# Patient Record
Sex: FEMALE | Race: WHITE | Employment: STUDENT | URBAN - METROPOLITAN AREA
[De-identification: names, ages, dates, MRNs, and addresses within clinical notes are randomized per-mention and may not be internally consistent; named-entity substitution may affect disease eponyms.]

---

## 2018-11-14 ENCOUNTER — ANESTHESIA EVENT (OUTPATIENT)
Dept: SURGERY | Age: 23
End: 2018-11-14
Payer: COMMERCIAL

## 2018-11-15 ENCOUNTER — ANESTHESIA (OUTPATIENT)
Dept: SURGERY | Age: 23
End: 2018-11-15
Payer: COMMERCIAL

## 2018-11-15 ENCOUNTER — HOSPITAL ENCOUNTER (OUTPATIENT)
Age: 23
Setting detail: OUTPATIENT SURGERY
Discharge: HOME | End: 2018-11-15
Attending: ORTHOPAEDIC SURGERY | Admitting: ORTHOPAEDIC SURGERY
Payer: COMMERCIAL

## 2018-11-15 VITALS
BODY MASS INDEX: 27.9 KG/M2 | SYSTOLIC BLOOD PRESSURE: 107 MMHG | OXYGEN SATURATION: 99 % | WEIGHT: 160 LBS | RESPIRATION RATE: 14 BRPM | TEMPERATURE: 98.8 F | HEART RATE: 55 BPM | DIASTOLIC BLOOD PRESSURE: 67 MMHG

## 2018-11-15 LAB — HCG UR QL: NEGATIVE

## 2018-11-15 PROCEDURE — 76210000020 HC REC RM PH II FIRST 0.5 HR: Performed by: ORTHOPAEDIC SURGERY

## 2018-11-15 PROCEDURE — 77030000032 HC CUF TRNQT ZIMM -B: Performed by: ORTHOPAEDIC SURGERY

## 2018-11-15 PROCEDURE — 74011250636 HC RX REV CODE- 250/636: Performed by: ORTHOPAEDIC SURGERY

## 2018-11-15 PROCEDURE — 74011000250 HC RX REV CODE- 250: Performed by: ORTHOPAEDIC SURGERY

## 2018-11-15 PROCEDURE — 74011250636 HC RX REV CODE- 250/636: Performed by: ANESTHESIOLOGY

## 2018-11-15 PROCEDURE — 77030010509 HC AIRWY LMA MSK TELE -A: Performed by: ANESTHESIOLOGY

## 2018-11-15 PROCEDURE — 76060000032 HC ANESTHESIA 0.5 TO 1 HR: Performed by: ORTHOPAEDIC SURGERY

## 2018-11-15 PROCEDURE — 76210000006 HC OR PH I REC 0.5 TO 1 HR: Performed by: ORTHOPAEDIC SURGERY

## 2018-11-15 PROCEDURE — 81025 URINE PREGNANCY TEST: CPT

## 2018-11-15 PROCEDURE — 74011250636 HC RX REV CODE- 250/636

## 2018-11-15 PROCEDURE — 74011250637 HC RX REV CODE- 250/637: Performed by: ANESTHESIOLOGY

## 2018-11-15 PROCEDURE — 76010000138 HC OR TIME 0.5 TO 1 HR: Performed by: ORTHOPAEDIC SURGERY

## 2018-11-15 PROCEDURE — 77030018836 HC SOL IRR NACL ICUM -A: Performed by: ORTHOPAEDIC SURGERY

## 2018-11-15 PROCEDURE — 77030019940 HC BLNKT HYPOTHRM STRY -B: Performed by: ANESTHESIOLOGY

## 2018-11-15 RX ORDER — ONDANSETRON 2 MG/ML
INJECTION INTRAMUSCULAR; INTRAVENOUS AS NEEDED
Status: DISCONTINUED | OUTPATIENT
Start: 2018-11-15 | End: 2018-11-15 | Stop reason: HOSPADM

## 2018-11-15 RX ORDER — LIDOCAINE HYDROCHLORIDE 10 MG/ML
0.1 INJECTION INFILTRATION; PERINEURAL AS NEEDED
Status: DISCONTINUED | OUTPATIENT
Start: 2018-11-15 | End: 2018-11-15 | Stop reason: HOSPADM

## 2018-11-15 RX ORDER — SODIUM CHLORIDE 0.9 % (FLUSH) 0.9 %
5-10 SYRINGE (ML) INJECTION AS NEEDED
Status: DISCONTINUED | OUTPATIENT
Start: 2018-11-15 | End: 2018-11-15 | Stop reason: HOSPADM

## 2018-11-15 RX ORDER — FENTANYL CITRATE 50 UG/ML
100 INJECTION, SOLUTION INTRAMUSCULAR; INTRAVENOUS ONCE
Status: DISCONTINUED | OUTPATIENT
Start: 2018-11-15 | End: 2018-11-15 | Stop reason: HOSPADM

## 2018-11-15 RX ORDER — DEXAMETHASONE SODIUM PHOSPHATE 4 MG/ML
INJECTION, SOLUTION INTRA-ARTICULAR; INTRALESIONAL; INTRAMUSCULAR; INTRAVENOUS; SOFT TISSUE AS NEEDED
Status: DISCONTINUED | OUTPATIENT
Start: 2018-11-15 | End: 2018-11-15 | Stop reason: HOSPADM

## 2018-11-15 RX ORDER — LIDOCAINE HYDROCHLORIDE 20 MG/ML
INJECTION, SOLUTION EPIDURAL; INFILTRATION; INTRACAUDAL; PERINEURAL AS NEEDED
Status: DISCONTINUED | OUTPATIENT
Start: 2018-11-15 | End: 2018-11-15 | Stop reason: HOSPADM

## 2018-11-15 RX ORDER — KETOROLAC TROMETHAMINE 30 MG/ML
INJECTION, SOLUTION INTRAMUSCULAR; INTRAVENOUS AS NEEDED
Status: DISCONTINUED | OUTPATIENT
Start: 2018-11-15 | End: 2018-11-15 | Stop reason: HOSPADM

## 2018-11-15 RX ORDER — FAMOTIDINE 20 MG/1
20 TABLET, FILM COATED ORAL ONCE
Status: COMPLETED | OUTPATIENT
Start: 2018-11-15 | End: 2018-11-15

## 2018-11-15 RX ORDER — MIDAZOLAM HYDROCHLORIDE 1 MG/ML
INJECTION, SOLUTION INTRAMUSCULAR; INTRAVENOUS AS NEEDED
Status: DISCONTINUED | OUTPATIENT
Start: 2018-11-15 | End: 2018-11-15 | Stop reason: HOSPADM

## 2018-11-15 RX ORDER — FENTANYL CITRATE 50 UG/ML
INJECTION, SOLUTION INTRAMUSCULAR; INTRAVENOUS AS NEEDED
Status: DISCONTINUED | OUTPATIENT
Start: 2018-11-15 | End: 2018-11-15 | Stop reason: HOSPADM

## 2018-11-15 RX ORDER — OXYCODONE HYDROCHLORIDE 5 MG/1
5 TABLET ORAL
Status: DISCONTINUED | OUTPATIENT
Start: 2018-11-15 | End: 2018-11-15 | Stop reason: HOSPADM

## 2018-11-15 RX ORDER — SODIUM CHLORIDE 0.9 % (FLUSH) 0.9 %
5-10 SYRINGE (ML) INJECTION EVERY 8 HOURS
Status: DISCONTINUED | OUTPATIENT
Start: 2018-11-15 | End: 2018-11-15 | Stop reason: HOSPADM

## 2018-11-15 RX ORDER — HYDROMORPHONE HYDROCHLORIDE 2 MG/ML
0.5 INJECTION, SOLUTION INTRAMUSCULAR; INTRAVENOUS; SUBCUTANEOUS
Status: DISCONTINUED | OUTPATIENT
Start: 2018-11-15 | End: 2018-11-15 | Stop reason: HOSPADM

## 2018-11-15 RX ORDER — CEFAZOLIN SODIUM/WATER 2 G/20 ML
2 SYRINGE (ML) INTRAVENOUS ONCE
Status: COMPLETED | OUTPATIENT
Start: 2018-11-15 | End: 2018-11-15

## 2018-11-15 RX ORDER — PROPOFOL 10 MG/ML
INJECTION, EMULSION INTRAVENOUS AS NEEDED
Status: DISCONTINUED | OUTPATIENT
Start: 2018-11-15 | End: 2018-11-15 | Stop reason: HOSPADM

## 2018-11-15 RX ORDER — BUPIVACAINE HYDROCHLORIDE 5 MG/ML
INJECTION, SOLUTION EPIDURAL; INTRACAUDAL AS NEEDED
Status: DISCONTINUED | OUTPATIENT
Start: 2018-11-15 | End: 2018-11-15 | Stop reason: HOSPADM

## 2018-11-15 RX ORDER — SODIUM CHLORIDE, SODIUM LACTATE, POTASSIUM CHLORIDE, CALCIUM CHLORIDE 600; 310; 30; 20 MG/100ML; MG/100ML; MG/100ML; MG/100ML
75 INJECTION, SOLUTION INTRAVENOUS CONTINUOUS
Status: DISCONTINUED | OUTPATIENT
Start: 2018-11-15 | End: 2018-11-15 | Stop reason: HOSPADM

## 2018-11-15 RX ORDER — OXYCODONE HYDROCHLORIDE 5 MG/1
10 TABLET ORAL
Status: COMPLETED | OUTPATIENT
Start: 2018-11-15 | End: 2018-11-15

## 2018-11-15 RX ORDER — ACETAMINOPHEN 10 MG/ML
INJECTION, SOLUTION INTRAVENOUS AS NEEDED
Status: DISCONTINUED | OUTPATIENT
Start: 2018-11-15 | End: 2018-11-15 | Stop reason: HOSPADM

## 2018-11-15 RX ORDER — MIDAZOLAM HYDROCHLORIDE 1 MG/ML
2 INJECTION, SOLUTION INTRAMUSCULAR; INTRAVENOUS ONCE
Status: DISCONTINUED | OUTPATIENT
Start: 2018-11-15 | End: 2018-11-15 | Stop reason: HOSPADM

## 2018-11-15 RX ADMIN — ONDANSETRON 4 MG: 2 INJECTION INTRAMUSCULAR; INTRAVENOUS at 13:37

## 2018-11-15 RX ADMIN — SODIUM CHLORIDE, SODIUM LACTATE, POTASSIUM CHLORIDE, AND CALCIUM CHLORIDE 75 ML/HR: 600; 310; 30; 20 INJECTION, SOLUTION INTRAVENOUS at 12:09

## 2018-11-15 RX ADMIN — LIDOCAINE HYDROCHLORIDE 100 MG: 20 INJECTION, SOLUTION EPIDURAL; INFILTRATION; INTRACAUDAL; PERINEURAL at 13:28

## 2018-11-15 RX ADMIN — Medication 2 G: at 13:32

## 2018-11-15 RX ADMIN — DEXAMETHASONE SODIUM PHOSPHATE 8 MG: 4 INJECTION, SOLUTION INTRA-ARTICULAR; INTRALESIONAL; INTRAMUSCULAR; INTRAVENOUS; SOFT TISSUE at 13:37

## 2018-11-15 RX ADMIN — OXYCODONE HYDROCHLORIDE 10 MG: 5 TABLET ORAL at 14:26

## 2018-11-15 RX ADMIN — FENTANYL CITRATE 50 MCG: 50 INJECTION, SOLUTION INTRAMUSCULAR; INTRAVENOUS at 13:28

## 2018-11-15 RX ADMIN — MIDAZOLAM HYDROCHLORIDE 2 MG: 1 INJECTION, SOLUTION INTRAMUSCULAR; INTRAVENOUS at 13:22

## 2018-11-15 RX ADMIN — FENTANYL CITRATE 50 MCG: 50 INJECTION, SOLUTION INTRAMUSCULAR; INTRAVENOUS at 13:37

## 2018-11-15 RX ADMIN — ACETAMINOPHEN 1000 MG: 10 INJECTION, SOLUTION INTRAVENOUS at 13:37

## 2018-11-15 RX ADMIN — FAMOTIDINE 20 MG: 20 TABLET ORAL at 12:09

## 2018-11-15 RX ADMIN — KETOROLAC TROMETHAMINE 30 MG: 30 INJECTION, SOLUTION INTRAMUSCULAR; INTRAVENOUS at 13:48

## 2018-11-15 RX ADMIN — PROPOFOL 200 MG: 10 INJECTION, EMULSION INTRAVENOUS at 13:28

## 2018-11-15 NOTE — DISCHARGE INSTRUCTIONS
INSTRUCTIONS FOLLOWING FOOT SURGERY    ACTIVITY  Elevate foot. No Ice  Protected partial weight bearing on the heel only as tolerated in post op shoe/boot after full sensation returns. Let the office know if dressing gets saturated with water . DIET  Day of Surgery: Clear liquids until no nausea or vomiting; then light, bland diet (Baked chicken, plain rice, grits, scrambled egg, toast). Nothing Greasy, fried or spicy today  Advance to regular diet on second day, unless your doctor orders otherwise. PAIN  Take pain medications as directed by your doctor. Call your doctor if pain is NOT relieved by medication. PAIN MED SIDE EFFECTS  Constipation: Lots of fluids, try prune juice, then OTC stool softeners if necessary  Nausea: Take medication with food. DRESSING CARE Keep dry and in place until follow up appointment    CALL YOUR DOCTOR IF YOU HAVE  Excessive bleeding that does not stop after holding mild pressure over the area. Temperature of 101 degrees or above. Redness, excessive swelling or bruising, and/or green or yellow, smelly discharge from incision. Loss of sensation - cold, white or blue toes. AFTER ANESTHESIA  For the first 24 hours and while taking narcotics for pain: DO NOT Drive, Drink Alcoholic beverages, or make important Decisions. Be aware of dizziness following anesthesia and while taking pain medication. Preventing Infection at Home  We care about preventing infection and avoiding the spread of germs - not only when you are in the hospital but also when you return home. When you return home from the hospital, its important to take the following steps to help prevent infection and avoid spreading germs that could infect you and others. Ask everyone in your home to follow these guidelines, too. Clean Your Hands  · Clean your hands whenever your hands are visibly dirty, before you eat, before or after touching your mouth, nose or eyes, and before preparing food.  Clean them after contact with body fluids, using the restroom, touching animals or changing diapers. · When washing hands, wet them with warm water and work up a lather. Rub hands for at least 15 seconds, then rinse them and pat them dry with a clean towel or paper towel. · When using hand sanitizers, it should take about 15 seconds to rub your hands dry. If not, you probably didnt apply enough . Cover Your Sneeze or Cough  Germs are released into the air whenever you sneeze or cough. To prevent the spread of infection:  · Turn away from other people before coughing or sneezing. · Cover your mouth or nose with a tissue when you cough or sneeze. Put the tissue in the trash. · If you dont have a tissue, cough or sneeze into your upper sleeve, not your hands. · Always clean your hands after coughing or sneezing. Care for Wounds  Your skin is your bodys first line of defense against germs, but an open wound leaves an easy way for germs to enter your body. To prevent infection:  · Clean your hands before and after changing wound dressings, and wear gloves to change dressings if recommended by your doctor. · Take special care with IV lines or other devices inserted into the body. If you must touch them, clean your hands first.  · Follow any specific instructions from your doctor to care for your wounds. Contact your doctor if you experience any signs of infection, such as fever or increased redness at the surgical or wound site. Keep a Clean Home  · Clean or wipe commonly touched hard surfaces like door handles, sinks, tabletops, phones and TV remotes. · Use products labeled disinfectant to kill harmful bacteria and viruses. · Use a clean cloth or paper towel to clean and dry surfaces. Wiping surfaces with a dirty dishcloth, sponge or towel will only spread germs.   · Never share toothbrushes, galeana, drinking glasses, utensils, razor blades, face cloths or bath towels to avoid spreading germs.  · Be sure that the linens that you sleep on are clean. · Keep pets away from wounds and wash your hands after touching pets, their toys or bedding. We care about you and your health. Remember, preventing infections is a team effort between you, your family, friends and health care providers. DISCHARGE SUMMARY from Nurse    PATIENT INSTRUCTIONS:    After general anesthesia or intravenous sedation, for 24 hours or while taking prescription Narcotics:  · Limit your activities  · Do not drive and operate hazardous machinery  · Do not make important personal or business decisions  · Do  not drink alcoholic beverages  · If you have not urinated within 8 hours after discharge, please contact your surgeon on call. *  Please give a list of your current medications to your Primary Care Provider. *  Please update this list whenever your medications are discontinued, doses are      changed, or new medications (including over-the-counter products) are added. *  Please carry medication information at all times in case of emergency situations. These are general instructions for a healthy lifestyle:    No smoking/ No tobacco products/ Avoid exposure to second hand smoke    Surgeon General's Warning:  Quitting smoking now greatly reduces serious risk to your health. Obesity, smoking, and sedentary lifestyle greatly increases your risk for illness    A healthy diet, regular physical exercise & weight monitoring are important for maintaining a healthy lifestyle    You may be retaining fluid if you have a history of heart failure or if you experience any of the following symptoms:  Weight gain of 3 pounds or more overnight or 5 pounds in a week, increased swelling in our hands or feet or shortness of breath while lying flat in bed. Please call your doctor as soon as you notice any of these symptoms; do not wait until your next office visit.     Recognize signs and symptoms of STROKE:    F-face looks uneven    A-arms unable to move or move unevenly    S-speech slurred or non-existent    T-time-call 911 as soon as signs and symptoms begin-DO NOT go       Back to bed or wait to see if you get better-TIME IS BRAIN.

## 2018-11-15 NOTE — ANESTHESIA PREPROCEDURE EVALUATION
Anesthetic History Review of Systems / Medical History Patient summary reviewed and pertinent labs reviewed Pulmonary Neuro/Psych Cardiovascular Exercise tolerance: >4 METS 
  
GI/Hepatic/Renal 
  
 
 
 
 
 
 Endo/Other Other Findings Physical Exam 
 
Airway Mallampati: I 
TM Distance: > 6 cm Neck ROM: normal range of motion Mouth opening: Normal 
 
 Cardiovascular Regular rate and rhythm,  S1 and S2 normal,  no murmur, click, rub, or gallop Rhythm: regular Rate: normal 
 
 
 
 Dental 
No notable dental hx Pulmonary Breath sounds clear to auscultation Abdominal 
 
 
 
 Other Findings Anesthetic Plan ASA: 1 Anesthesia type: general 
 
 
 
 
 
Anesthetic plan and risks discussed with: Patient

## 2018-11-15 NOTE — BRIEF OP NOTE
BRIEF OPERATIVE NOTE Date of Procedure: 11/15/2018 Preoperative Diagnosis: Ganglion cyst of left foot [M67.472] Postoperative Diagnosis: Ganglion cyst of left foot [M67.472] Procedure(s): LEFT MIDFOOT EXOSTECTOMY 
LEFT DPN NEUROLYSIS ANES-PT CHOICE Surgeon(s) and Role: * Jake Del Valle MD - Primary Surgical Assistant: no 
 
Surgical Staff: 
Circ-1: Suzy Pérez RN Scrub Tech-1: Carlos Ramirez Event Time In Time Out Incision Start [phone] Incision Close 7716 Anesthesia: General  
Estimated Blood Loss: min Specimens: * No specimens in log * Findings: no 
Complications: no 
Implants: * No implants in log *

## 2018-11-16 NOTE — OP NOTES
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 PROCEDURES PERFORMED:    1. Left midfoot exostectomy. 2.  Left deep peroneal tendon neurolysis. SURGEON:  Becca Harkins MD        ANESTHESIA:  Local anesthesia with general.    ESTIMATED BLOOD LOSS:  Minimal.        COMPLICATIONS:  None. TOURNIQUET TIME:  15 minutes at 250 mmHg. ANTIBIOTIC PROPHYLAXIS:  Ancef was given prior to procedure. INDICATION FOR PROCEDURE:  This patient is a 59-year-old white female with symptomatic left mid foot exostosis and deep peroneal nerve neuritis. She has failed conservative therapy and desires surgical treatment. Risks and benefits of procedure including but not limited to risk of anesthetic complication including myocardial infarction, stroke, and death, as well as surgical complications including damage to nerves and blood vessels, risk for infection, incomplete pain relief, and risk and need for additional surgery were discussed with the patient. She understands the risks and wishes to proceed with surgery at this time. DESCRIPTION OF PROCEDURE:  The patient's operative site was marked with indelible ink in the preoperative holding area. She was brought to operating room and placed supine. After preoperative surgical timeout, the left lower extremity was identified as the surgical site, prepped and draped in standard sterile fashion with ChloraPrep solution. A dorsal approach to the mid foot was then performed at that time. The deep peroneal nerve and dorsalis pedis artery were identified and carefully protected.   The deep peroneal nerve neurolysis was performed using a dissecting scissors that time as well. Capsulotomy of the underlying joint was then performed, and a mid foot exostectomy of the cuneiforms was performed using a rongeur. Wounds were irrigated and closed using Monocryl and nylon sutures throughout. A sterile dressing was then applied. Anesthesia was discontinued. The patient was subsequently transferred to a recovery bed and taken to the recovery room in satisfactory condition. She appeared to tolerate the procedure well. There were no apparent surgical or anesthetic complications. All needle and sponge counts correct.       MD ERNESTINE Mccallum / MN  D: 11/15/2018 15:57     T: 11/15/2018 20:15  JOB #: [id number]

## 2018-11-30 ENCOUNTER — HOSPITAL ENCOUNTER (OUTPATIENT)
Dept: PHYSICAL THERAPY | Age: 23
Discharge: HOME | End: 2018-11-30
Payer: COMMERCIAL

## 2018-11-30 PROCEDURE — 97110 THERAPEUTIC EXERCISES: CPT

## 2018-11-30 PROCEDURE — 97140 MANUAL THERAPY 1/> REGIONS: CPT

## 2018-11-30 PROCEDURE — 97016 VASOPNEUMATIC DEVICE THERAPY: CPT

## 2018-11-30 PROCEDURE — 97161 PT EVAL LOW COMPLEX 20 MIN: CPT

## 2018-11-30 NOTE — THERAPY EVALUATION
Dominique Grayson  : 1995 [id number] [facility]  [address] [place] [address].  [phone]   [phone]        OUTPATIENT PHYSICAL THERAPY:Initial Assessment 2018         ICD-10: Treatment Diagnosis: Pain in left foot (S85.917); Difficulty in walking, not elsewhere classified (R26.2)  Precautions/Allergies:   Patient has no known allergies. Fall Risk Score:    Ambulatory/Rehab Services H2 Model Falls Risk Assessment    Risk Factors:       No Risk Factors Identified Ability to Rise from Chair:       (0)  Ability to rise in a single movement    Falls Prevention Plan:       No modifications necessary   Total: (5 or greater = High Risk): 0     [facility] [employer]. All Rights Reserved. [facility] Patent #[id number]. Federal Law prohibits the replication, distribution or use without written permission from [employer]      (? 5 = High Risk)  MD Orders: evaluate and treat MEDICAL/REFERRING DIAGNOSIS:  Foot pain [M79.673]   DATE OF ONSET: date of surgery: 11/15/18; please refer to medical chart for specifics  REFERRING PHYSICIAN: Segundo De La Fuente MD  RETURN PHYSICIAN APPOINTMENT: 2019     INITIAL ASSESSMENT:  Ms. Jann Elmore presents to therapy with left foot pain following above surgical procedure with removal of accessory cuneiform and release of deep peroneal nerve. She has impaired mobility through the foot and ankle with rigid, pes cavus foot posture, increased tightness of the talocrural joint and calf musculature, and increased swelling of the midfoot. She has weakness throughout the lower extremity. Weakness is present in the calf and plantar flexor musculature secondary to atrophy from immobilization. Weakness is present through the intrinsic foot extensor musculature secondary to residual motor dysfunction. This causes difficulty with all walking and mobility tasks.  Skilled therapy is required to return to prior level of function. PROBLEM LIST (Impacting functional limitations):  1. Decreased Strength  2. Decreased ADL/Functional Activities  3. Decreased Transfer Abilities  4. Decreased Ambulation Ability/Technique  5. Decreased Balance  6. Increased Pain  7. Decreased Activity Tolerance  8. Decreased Flexibility/Joint Mobility INTERVENTIONS PLANNED:  1. Balance Exercise  2. Family Education  3. Gait Training  4. Home Exercise Program (HEP)  5. Manual Therapy  6. Neuromuscular Re-education/Strengthening  7. Range of Motion (ROM)  8. Therapeutic Activites  9. Therapeutic Exercise/Strengthening  10. modalities    TREATMENT PLAN:  Effective Dates: 11/30/2018 TO 3/3/2019 (90 days). Frequency/Duration: 3 times a week for 90 Days  GOALS: (Goals have been discussed and agreed upon with patient.)  Short-Term Functional Goals: Time Frame: 2 weeks  1. Patient will be independent with HEP. 2. Patient will demonstrate sufficient healing to discontinue use of CAM boot for all mobility. 3. Patient will improve ankle DF to 10 ° to restore joint mobility for symmetric stance phase of gait during walking speeds. Discharge Goals: Time Frame: 6 weeks  1. Patient will report no pain with daily activity. 2. Patient will be able to perform 25 single limb heel raises to normalize calf strength for return to sport demands. 3. Patient will improve ankle DF to 15 ° to restore mobility required for symmetric gait at all speeds. Rehabilitation Potential For Stated Goals: Excellent  Regarding Ervin Beard's therapy, I certify that the treatment plan above will be carried out by a therapist or under their direction. Thank you for this referral,  Zahida Meza DPT       Referring Physician Signature: Paras Sorenson MD              Date                      HISTORY:   History of Present Injury/Illness (Reason for Referral):  Patient presents to therapy with primary complaint of left foot pain following above surgical repair.  Initial symptoms began over the summer when she began to notice a lump at the dorsum of the midfoot. She tried various modifications to shoe lacing to improve her comfort but she began to have numbness into the toes and difficulty with running and walking activities. She was also having pain with pushing off during the running stride or with plantar flexion movements. She notes no complications with surgery and reports the foot has been feeling good. She continues to have some numbness into the toes and reports that the ankle feels weak. She is no longer having to take medication to manage her symptoms. She notes she is cleared to wean from the boot as her symptoms allow but she has had continued soreness and swelling with increased walking around campus. 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 Past Medical History/Comorbidities:   Ms. Macie Hernandez  has a past medical history of Intestinal malrotation. Ms. Macie Hernandez  has a past surgical history that includes pr abdomen surgery proc unlisted (2011); hx appendectomy (2011); hx wisdom teeth extraction; LEFT MIDFOOT EXOSTECTOMY (Left, 11/15/2018); and LEFT DPN NEUROLYSIS ANES-PT CHOICE (Left, 11/15/2018). Social History/Living Environment:     Patient is a college student and lives on campus. 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 , Disp: , Rfl:     BLISOVI FE 1.5/30, 28, 1.5 mg-30 mcg (21)/75 mg (7) tab, , Disp: , Rfl:    Date Last Reviewed:  11/30/2018   # of Personal Factors/Comorbidities that affect the Plan of Care: 0: LOW COMPLEXITY EXAMINATION:   Observation/Orthostatic Postural Assessment:          Girth: Figure 8: 49cm B; Mid foot: 23cm L, 22cm R; Calf: 38cm L, 40 cm R. Ambulates with CAM boot. Incision is closed, dry, and covered with steri strips. Palpation:          Mild tenderness with palpation of the midfoot. (+) Tinels at the deep peroneal nerve at the anterior talocrural joint. ROM:     Ankle DF: 0 ° L; 10 ° R  Ankle PF: WNL  Ankle Inversion: 30 ° L; 40 ° R  Ankle eversion: 10 ° L; 22 ° R  Hip and knee mobility are foot      Strength:     EHL: 3/5 L; 5/5 R  Eversion: 4/5 L; 5/5 R  Inversion: 4+/5 L; 5/5 R  DF: 4-/5 L; 5/5 R  PF: 4-/5 L; 5/5 R      Special Tests:          Not applicable  Neurological Screen:        Light touch is diminished along lateral lower leg and into the dorsum of the foot. Functional Mobility:         Ambulates with walking boot  Balance:          Not currently indicated   Body Structures Involved:  1. Nerves  2. Bones  3. Joints  4. Muscles  5. Ligaments Body Functions Affected:  1. Sensory/Pain  2. Neuromusculoskeletal  3. Movement Related Activities and Participation Affected:  1. Learning and Applying Knowledge  2. General Tasks and Demands  3. Mobility  4. Self Care  5. Domestic Life  6. Interpersonal Interactions and Relationships  7. Community, Social and [place]   # of elements that affect the Plan of Care: 1-2: LOW COMPLEXITY   CLINICAL PRESENTATION:   Presentation: Stable and uncomplicated: LOW COMPLEXITY   CLINICAL DECISION MAKING:   Outcome Measure: Tool Used: Lower Extremity Functional Scale (LEFS)  Score:  Initial: 58/80 Most Recent: X/80 (Date: -- )   Interpretation of Score: 20 questions each scored on a 5 point scale with 0 representing \"extreme difficulty or unable to perform\" and 4 representing \"no difficulty\". The lower the score, the greater the functional disability. 80/80 represents no disability. Minimal detectable change is 9 points.   Score 80 79-63 62-48 47-32 31-16 15-1 0 Modifier CH CI CJ CK CL CM CN         Medical Necessity:   · Patient is expected to demonstrate progress in strength, range of motion, balance and coordination to increase independence with community mobility and return to prior level of function. Reason for Services/Other Comments:  · Patient has demonstrated an improvement in functional level by independent performance of HEP. Use of outcome tool(s) and clinical judgement create a POC that gives a: Clear prediction of patient's progress: LOW COMPLEXITY   TREATMENT:   (In addition to Assessment/Re-Assessment sessions the following treatments were rendered)  THERAPEUTIC EXERCISE: (see below for minutes):  Exercises per grid below to improve mobility, strength, balance and coordination. Required minimal verbal and manual cues to promote proper body alignment, promote proper body posture and promote proper body mechanics. Progressed resistance, range, repetitions and complexity of movement as indicated. MANUAL THERAPY: (see below for minutes): Joint mobilization and Soft tissue mobilization was utilized and necessary because of the patient's restricted joint motion, painful spasm, loss of articular motion and restricted motion of soft tissue. MODALITIES: (see below for minutes): To decrease pain     Date: 11/3018        Modalities: 15 min       Gameready vasopneumatic with premod stim to peroneal nerve to decrease pain and swelling                       Therapeutic Exercise: 15  min       Lift-spread-place 5 min       Towel curls 3 min       Ankle circles 30x CW and CCW       Supine peroneal nerve glides With hip biased into adduction/IR [id number]       Hip abduction 3x15               Proprioceptive Activities:                                Manual Therapy:                        Therapeutic Activities:                                        HEP: see 11/30/18 flow sheet for specifics.   [employer] Portal  Treatment/Session Assessment:  Patient is independent with performance of above described home program.    · Pain/ Symptoms: Initial:   8/10 Post Session:  No increase following today's treatment session. ·   Compliance with Program/Exercises: Will assess as treatment progresses. · Recommendations/Intent for next treatment session: \"Next visit will focus on advancements to more challenging activities\".   Total Treatment Duration:  PT Patient Time In/Time Out  Time In: 1445  Time Out: [address], DPT

## 2018-12-04 ENCOUNTER — HOSPITAL ENCOUNTER (OUTPATIENT)
Dept: PHYSICAL THERAPY | Age: 23
Discharge: HOME | End: 2018-12-04
Payer: COMMERCIAL

## 2018-12-04 PROCEDURE — 97110 THERAPEUTIC EXERCISES: CPT

## 2018-12-04 PROCEDURE — 97022 WHIRLPOOL THERAPY: CPT

## 2018-12-04 NOTE — PROGRESS NOTES
Dominique Grayson  : 1995 [address] [facility] [address]  [address] [place] [address]  [phone]   [phone]        OUTPATIENT PHYSICAL THERAPY:Daily Note 2018         ICD-10: Treatment Diagnosis: Pain in left foot (L84.516); Difficulty in walking, not elsewhere classified (R26.2)  Precautions/Allergies:   Patient has no known allergies. Fall Risk Score:    Ambulatory/Rehab Services H2 Model Falls Risk Assessment    Risk Factors:       No Risk Factors Identified Ability to Rise from Chair:       (0)  Ability to rise in a single movement    Falls Prevention Plan:       No modifications necessary   Total: (5 or greater = High Risk): 0     [facility] [employer]. All Rights Reserved. [facility] Patent #[id number]. Federal Law prohibits the replication, distribution or use without written permission from [employer]      (? 5 = High Risk)  MD Orders: evaluate and treat MEDICAL/REFERRING DIAGNOSIS:  Pain in unspecified foot [M79.673]   DATE OF ONSET: date of surgery: 11/15/18; please refer to medical chart for specifics  REFERRING PHYSICIAN: Segundo De La Fuente MD  RETURN PHYSICIAN APPOINTMENT: 2019     INITIAL ASSESSMENT:  Ms. Jann Elmore presents to therapy with left foot pain following above surgical procedure with removal of accessory cuneiform and release of deep peroneal nerve. She has impaired mobility through the foot and ankle with rigid, pes cavus foot posture, increased tightness of the talocrural joint and calf musculature, and increased swelling of the midfoot. She has weakness throughout the lower extremity. Weakness is present in the calf and plantar flexor musculature secondary to atrophy from immobilization. Weakness is present through the intrinsic foot extensor musculature secondary to residual motor dysfunction. This causes difficulty with all walking and mobility tasks.  Skilled therapy is required to return to prior level of function. PROBLEM LIST (Impacting functional limitations):  1. Decreased Strength  2. Decreased ADL/Functional Activities  3. Decreased Transfer Abilities  4. Decreased Ambulation Ability/Technique  5. Decreased Balance  6. Increased Pain  7. Decreased Activity Tolerance  8. Decreased Flexibility/Joint Mobility INTERVENTIONS PLANNED:  1. Balance Exercise  2. Family Education  3. Gait Training  4. Home Exercise Program (HEP)  5. Manual Therapy  6. Neuromuscular Re-education/Strengthening  7. Range of Motion (ROM)  8. Therapeutic Activites  9. Therapeutic Exercise/Strengthening  10. modalities    TREATMENT PLAN:  Effective Dates: 11/30/2018 TO 3/3/2019 (90 days). Frequency/Duration: 3 times a week for 90 Days  GOALS: (Goals have been discussed and agreed upon with patient.)  Short-Term Functional Goals: Time Frame: 2 weeks  1. Patient will be independent with HEP. 2. Patient will demonstrate sufficient healing to discontinue use of CAM boot for all mobility. 3. Patient will improve ankle DF to 10 ° to restore joint mobility for symmetric stance phase of gait during walking speeds. Discharge Goals: Time Frame: 6 weeks  1. Patient will report no pain with daily activity. 2. Patient will be able to perform 25 single limb heel raises to normalize calf strength for return to sport demands. 3. Patient will improve ankle DF to 15 ° to restore mobility required for symmetric gait at all speeds. Rehabilitation Potential For Stated Goals: Excellent                HISTORY:   History of Present Injury/Illness (Reason for Referral):  Patient presents to therapy with primary complaint of left foot pain following above surgical repair. Initial symptoms began over the summer when she began to notice a lump at the dorsum of the midfoot. She tried various modifications to shoe lacing to improve her comfort but she began to have numbness into the toes and difficulty with running and walking activities.  She was also having pain with pushing off during the running stride or with plantar flexion movements. She notes no complications with surgery and reports the foot has been feeling good. She continues to have some numbness into the toes and reports that the ankle feels weak. She is no longer having to take medication to manage her symptoms. She notes she is cleared to wean from the boot as her symptoms allow but she has had continued soreness and swelling with increased walking around campus. 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 Past Medical History/Comorbidities:   Ms. Aleta Mukherjee  has a past medical history of Intestinal malrotation. Ms. Aleta Mukherjee  has a past surgical history that includes pr abdomen surgery proc unlisted (2011); hx appendectomy (2011); hx wisdom teeth extraction; LEFT MIDFOOT EXOSTECTOMY (Left, 11/15/2018); and LEFT DPN NEUROLYSIS ANES-PT CHOICE (Left, 11/15/2018). Social History/Living Environment:     Patient is a college student and lives on campus. 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 , Disp: , Rfl:     BLISOVI FE 1.5/30, 28, 1.5 mg-30 mcg (21)/75 mg (7) tab, , Disp: , Rfl:    Date Last Reviewed:  12/4/2018   EXAMINATION:   Observation/Orthostatic Postural Assessment:          Girth: Figure 8: 49cm B; Mid foot: 23cm L, 22cm R; Calf: 38cm L, 40 cm R. Ambulates with CAM boot. Incision is closed, dry, and covered with steri strips. Palpation:          Mild tenderness with palpation of the midfoot. (+) Tinels at the deep peroneal nerve at the anterior talocrural joint.   ROM: Ankle DF: 0 ° L; 10 ° R  Ankle PF: WNL  Ankle Inversion: 30 ° L; 40 ° R  Ankle eversion: 10 ° L; 22 ° R  Hip and knee mobility are foot      Strength:     EHL: 3/5 L; 5/5 R  Eversion: 4/5 L; 5/5 R  Inversion: 4+/5 L; 5/5 R  DF: 4-/5 L; 5/5 R  PF: 4-/5 L; 5/5 R      Special Tests:          Not applicable  Neurological Screen:        Light touch is diminished along lateral lower leg and into the dorsum of the foot. Functional Mobility:         Ambulates with walking boot  Balance:          Not currently indicated   Body Structures Involved:  1. Nerves  2. Bones  3. Joints  4. Muscles  5. Ligaments Body Functions Affected:  1. Sensory/Pain  2. Neuromusculoskeletal  3. Movement Related Activities and Participation Affected:  1. Learning and Applying Knowledge  2. General Tasks and Demands  3. Mobility  4. Self Care  5. Domestic Life  6. Interpersonal Interactions and Relationships  7. Community, Social and Civic Life   CLINICAL PRESENTATION:   CLINICAL DECISION MAKING:   Outcome Measure: Tool Used: Lower Extremity Functional Scale (LEFS)  Score:  Initial: 58/80 Most Recent: X/80 (Date: -- )   Interpretation of Score: 20 questions each scored on a 5 point scale with 0 representing \"extreme difficulty or unable to perform\" and 4 representing \"no difficulty\". The lower the score, the greater the functional disability. 80/80 represents no disability. Minimal detectable change is 9 points. Score 80 79-63 62-48 47-32 31-16 15-1 0   Modifier CH CI CJ CK CL CM CN         Medical Necessity:   · Patient is expected to demonstrate progress in strength, range of motion, balance and coordination to increase independence with community mobility and return to prior level of function. Reason for Services/Other Comments:  · Patient has demonstrated an improvement in functional level by independent performance of HEP.    TREATMENT:   (In addition to Assessment/Re-Assessment sessions the following treatments were rendered)  THERAPEUTIC EXERCISE: (see below for minutes):  Exercises per grid below to improve mobility, strength, balance and coordination. Required minimal verbal and manual cues to promote proper body alignment, promote proper body posture and promote proper body mechanics. Progressed resistance, range, repetitions and complexity of movement as indicated. MANUAL THERAPY: (see below for minutes): Joint mobilization and Soft tissue mobilization was utilized and necessary because of the patient's restricted joint motion, painful spasm, loss of articular motion and restricted motion of soft tissue. MODALITIES: (see below for minutes): To decrease pain     Date: 11/3018 12/04/18 (visit 2)      Modalities: 15 min 10 min      Gameready vasopneumatic with premod stim to peroneal nerve to decrease pain and swelling Cold whirlpool x 10 min                       Therapeutic Exercise: 15  min 45 min      Lift-spread-place 5 min 5 min      Towel curls 3 min Inversion and eversion 10# with towel 3 min each      Ankle circles 30x CW and CCW       Supine peroneal nerve glides With hip biased into adduction/IR [id number]       Hip abduction 3x15       Bike  5 min      Ankle MWM   20x with LLE forward      Knee hugs  x1 lap      Shuffle walk  x1 lap      Ankle PF  Seated black tband 2x20      Toe raises  Leaning on wall 2x20              Proprioceptive Activities:                                Manual Therapy:                        Therapeutic Activities:                                        HEP: see 11/30/18 flow sheet for specifics. [facility] Portal  Treatment/Session Assessment:  Demonstrates sufficient healing to discontinue use of walking boot around apartment but will continue to use boot around campus to allow for further resolution of swelling and soreness. · Pain/ Symptoms: Initial:   6/10 \"It's been less sore. It doesn't seem to be as swollen either. \" Post Session:  No increase following today's treatment session. ·   Compliance with Program/Exercises: Will assess as treatment progresses. · Recommendations/Intent for next treatment session: \"Next visit will focus on advancements to more challenging activities\".   Total Treatment Duration:  PT Patient Time In/Time Out  Time In: 0845  Time Out: Guerita Reynoso [address], DPT

## 2018-12-06 ENCOUNTER — HOSPITAL ENCOUNTER (OUTPATIENT)
Dept: PHYSICAL THERAPY | Age: 23
Discharge: HOME | End: 2018-12-06
Payer: COMMERCIAL

## 2018-12-06 PROCEDURE — 97110 THERAPEUTIC EXERCISES: CPT

## 2018-12-06 PROCEDURE — 97022 WHIRLPOOL THERAPY: CPT

## 2018-12-06 NOTE — PROGRESS NOTES
Rochelle Gusman  : 1995 [address] [facility]  [facility] [address]  [phone]   [phone]        OUTPATIENT PHYSICAL THERAPY:Daily Note 2018         ICD-10: Treatment Diagnosis: Pain in left foot (E07.023); Difficulty in walking, not elsewhere classified (R26.2)  Precautions/Allergies:   Patient has no known allergies. Fall Risk Score:    Ambulatory/Rehab Services H2 Model Falls Risk Assessment    Risk Factors:       No Risk Factors Identified Ability to Rise from Chair:       (0)  Ability to rise in a single movement    Falls Prevention Plan:       No modifications necessary   Total: (5 or greater = High Risk): 0     [facility] [employer]. All Rights Reserved. [facility] Patent #[id number]. Federal Law prohibits the replication, distribution or use without written permission from [employer]      (? 5 = High Risk)  MD Orders: evaluate and treat MEDICAL/REFERRING DIAGNOSIS:  Pain in unspecified foot [M79.673]   DATE OF ONSET: date of surgery: 11/15/18; please refer to medical chart for specifics  REFERRING PHYSICIAN: López Almaraz MD  RETURN PHYSICIAN APPOINTMENT: 2019     INITIAL ASSESSMENT:  Ms. Sylvia Ramires presents to therapy with left foot pain following above surgical procedure with removal of accessory cuneiform and release of deep peroneal nerve. She has impaired mobility through the foot and ankle with rigid, pes cavus foot posture, increased tightness of the talocrural joint and calf musculature, and increased swelling of the midfoot. She has weakness throughout the lower extremity. Weakness is present in the calf and plantar flexor musculature secondary to atrophy from immobilization. Weakness is present through the intrinsic foot extensor musculature secondary to residual motor dysfunction. This causes difficulty with all walking and mobility tasks.  Skilled therapy is required to return to prior level of function. PROBLEM LIST (Impacting functional limitations):  1. Decreased Strength  2. Decreased ADL/Functional Activities  3. Decreased Transfer Abilities  4. Decreased Ambulation Ability/Technique  5. Decreased Balance  6. Increased Pain  7. Decreased Activity Tolerance  8. Decreased Flexibility/Joint Mobility INTERVENTIONS PLANNED:  1. Balance Exercise  2. Family Education  3. Gait Training  4. Home Exercise Program (HEP)  5. Manual Therapy  6. Neuromuscular Re-education/Strengthening  7. Range of Motion (ROM)  8. Therapeutic Activites  9. Therapeutic Exercise/Strengthening  10. modalities    TREATMENT PLAN:  Effective Dates: 11/30/2018 TO 3/3/2019 (90 days). Frequency/Duration: 3 times a week for 90 Days  GOALS: (Goals have been discussed and agreed upon with patient.)  Short-Term Functional Goals: Time Frame: 2 weeks  1. Patient will be independent with HEP. 2. Patient will demonstrate sufficient healing to discontinue use of CAM boot for all mobility. 3. Patient will improve ankle DF to 10 ° to restore joint mobility for symmetric stance phase of gait during walking speeds. Discharge Goals: Time Frame: 6 weeks  1. Patient will report no pain with daily activity. 2. Patient will be able to perform 25 single limb heel raises to normalize calf strength for return to sport demands. 3. Patient will improve ankle DF to 15 ° to restore mobility required for symmetric gait at all speeds. Rehabilitation Potential For Stated Goals: Excellent                HISTORY:   History of Present Injury/Illness (Reason for Referral):  Patient presents to therapy with primary complaint of left foot pain following above surgical repair. Initial symptoms began over the summer when she began to notice a lump at the dorsum of the midfoot. She tried various modifications to shoe lacing to improve her comfort but she began to have numbness into the toes and difficulty with running and walking activities.  She was also having pain with pushing off during the running stride or with plantar flexion movements. She notes no complications with surgery and reports the foot has been feeling good. She continues to have some numbness into the toes and reports that the ankle feels weak. She is no longer having to take medication to manage her symptoms. She notes she is cleared to wean from the boot as her symptoms allow but she has had continued soreness and swelling with increased walking around campus. 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 Past Medical History/Comorbidities:   Ms. Manohar River  has a past medical history of Intestinal malrotation. Ms. Manohar River  has a past surgical history that includes pr abdomen surgery proc unlisted (2011); hx appendectomy (2011); hx wisdom teeth extraction; LEFT MIDFOOT EXOSTECTOMY (Left, 11/15/2018); and LEFT DPN NEUROLYSIS ANES-PT CHOICE (Left, 11/15/2018). Social History/Living Environment:     Patient is a college student and lives on campus. 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 , Disp: , Rfl:     BLISOVI FE 1.5/30, 28, 1.5 mg-30 mcg (21)/75 mg (7) tab, , Disp: , Rfl:    Date Last Reviewed:  12/6/2018   EXAMINATION:   Observation/Orthostatic Postural Assessment:          Girth: Figure 8: 49cm B; Mid foot: 23cm L, 22cm R; Calf: 38cm L, 40 cm R. Ambulates with CAM boot. Incision is closed, dry, and covered with steri strips. Palpation:          Mild tenderness with palpation of the midfoot. (+) Tinels at the deep peroneal nerve at the anterior talocrural joint.   ROM: Ankle DF: 0 ° L; 10 ° R  Ankle PF: WNL  Ankle Inversion: 30 ° L; 40 ° R  Ankle eversion: 10 ° L; 22 ° R  Hip and knee mobility are foot      Strength:     EHL: 3/5 L; 5/5 R  Eversion: 4/5 L; 5/5 R  Inversion: 4+/5 L; 5/5 R  DF: 4-/5 L; 5/5 R  PF: 4-/5 L; 5/5 R      Special Tests:          Not applicable  Neurological Screen:        Light touch is diminished along lateral lower leg and into the dorsum of the foot. Functional Mobility:         Ambulates with walking boot  Balance:          Not currently indicated   Body Structures Involved:  1. Nerves  2. Bones  3. Joints  4. Muscles  5. Ligaments Body Functions Affected:  1. Sensory/Pain  2. Neuromusculoskeletal  3. Movement Related Activities and Participation Affected:  1. Learning and Applying Knowledge  2. General Tasks and Demands  3. Mobility  4. Self Care  5. Domestic Life  6. Interpersonal Interactions and Relationships  7. Community, Social and Civic Life   CLINICAL PRESENTATION:   CLINICAL DECISION MAKING:   Outcome Measure: Tool Used: Lower Extremity Functional Scale (LEFS)  Score:  Initial: 58/80 Most Recent: X/80 (Date: -- )   Interpretation of Score: 20 questions each scored on a 5 point scale with 0 representing \"extreme difficulty or unable to perform\" and 4 representing \"no difficulty\". The lower the score, the greater the functional disability. 80/80 represents no disability. Minimal detectable change is 9 points. Score 80 79-63 62-48 47-32 31-16 15-1 0   Modifier CH CI CJ CK CL CM CN         Medical Necessity:   · Patient is expected to demonstrate progress in strength, range of motion, balance and coordination to increase independence with community mobility and return to prior level of function. Reason for Services/Other Comments:  · Patient has demonstrated an improvement in functional level by independent performance of HEP.    TREATMENT:   (In addition to Assessment/Re-Assessment sessions the following treatments were rendered)  THERAPEUTIC EXERCISE: (see below for minutes):  Exercises per grid below to improve mobility, strength, balance and coordination. Required minimal verbal and manual cues to promote proper body alignment, promote proper body posture and promote proper body mechanics. Progressed resistance, range, repetitions and complexity of movement as indicated. MANUAL THERAPY: (see below for minutes): Joint mobilization and Soft tissue mobilization was utilized and necessary because of the patient's restricted joint motion, painful spasm, loss of articular motion and restricted motion of soft tissue. MODALITIES: (see below for minutes): To decrease pain     Date: 11/3018 12/04/18 (visit 2) 12/06/18 (visit 3)     Modalities: 15 min 10 min 10 min     Gameready vasopneumatic with premod stim to peroneal nerve to decrease pain and swelling Cold whirlpool x 10 min  repeat                     Therapeutic Exercise: 15  min 45 min 45 min     Lift-spread-place 5 min 5 min      Towel curls 3 min Inversion and eversion 10# with towel 3 min each Inversion/eversion black tband 30x     Ankle circles 30x CW and CCW       Supine peroneal nerve glides With hip biased into adduction/IR [id number]       Hip abduction 3x15       Bike  5 min 5 min     Ankle MWM   20x with LLE forward Repeat; manual long axis distraction gr 4; anterior proximal fibular glides gr 4 3x30 to each     Knee hugs  x1 lap repeat     Shuffle walk  x1 lap repeat     Ankle PF  Seated black tband 2x20 Standing with med ball between ankles 3x15     Toe raises  Leaning on wall 2x20 repeat             Proprioceptive Activities:                                Manual Therapy:                        Therapeutic Activities:                                        HEP: see 11/30/18 flow sheet for specifics. [facility] Portal  Treatment/Session Assessment:  Will continue to use walking boot for community ambulation. Sensation has been restored to 4th and 5th toes. · Pain/ Symptoms: Initial:   6/10 \"I was on it a little more the past few days and it's been a little more sore. \" Post Session:  No increase following today's treatment session. ·   Compliance with Program/Exercises: Will assess as treatment progresses. · Recommendations/Intent for next treatment session: \"Next visit will focus on advancements to more challenging activities\".   Total Treatment Duration:  PT Patient Time In/Time Out  Time In: 1445  Time Out: [id number], DPT

## 2018-12-07 ENCOUNTER — HOSPITAL ENCOUNTER (OUTPATIENT)
Dept: PHYSICAL THERAPY | Age: 23
Discharge: HOME | End: 2018-12-07
Payer: COMMERCIAL

## 2018-12-07 PROCEDURE — 97022 WHIRLPOOL THERAPY: CPT

## 2018-12-07 PROCEDURE — 97110 THERAPEUTIC EXERCISES: CPT

## 2018-12-07 NOTE — PROGRESS NOTES
Johanna Alicea  : 1995 [id number] [facility],[address]  [address] [place] [address]  [phone]   [phone]        OUTPATIENT PHYSICAL THERAPY:Daily Note 2018         ICD-10: Treatment Diagnosis: Pain in left foot (L88.871); Difficulty in walking, not elsewhere classified (R26.2)  Precautions/Allergies:   Patient has no known allergies. Fall Risk Score:    Ambulatory/Rehab Services H2 Model Falls Risk Assessment    Risk Factors:       No Risk Factors Identified Ability to Rise from Chair:       (0)  Ability to rise in a single movement    Falls Prevention Plan:       No modifications necessary   Total: (5 or greater = High Risk): 0     [facility]. All Rights Reserved. [facility] Patent #[id number]. Federal Law prohibits the replication, distribution or use without written permission from [facility] of [address] [place] [address]      (? 5 = High Risk)  MD Orders: evaluate and treat MEDICAL/REFERRING DIAGNOSIS:  Pain in unspecified foot [M79.673]   DATE OF ONSET: date of surgery: 11/15/18; please refer to medical chart for specifics  REFERRING PHYSICIAN: Nona Dominguez MD  RETURN PHYSICIAN APPOINTMENT: 2019     INITIAL ASSESSMENT:  Ms. Robby Trivedi presents to therapy with left foot pain following above surgical procedure with removal of accessory cuneiform and release of deep peroneal nerve. She has impaired mobility through the foot and ankle with rigid, pes cavus foot posture, increased tightness of the talocrural joint and calf musculature, and increased swelling of the midfoot. She has weakness throughout the lower extremity. Weakness is present in the calf and plantar flexor musculature secondary to atrophy from immobilization. Weakness is present through the intrinsic foot extensor musculature secondary to residual motor dysfunction. This causes difficulty with all walking and mobility tasks.  Skilled therapy is required to return to prior level of function. PROBLEM LIST (Impacting functional limitations):  1. Decreased Strength  2. Decreased ADL/Functional Activities  3. Decreased Transfer Abilities  4. Decreased Ambulation Ability/Technique  5. Decreased Balance  6. Increased Pain  7. Decreased Activity Tolerance  8. Decreased Flexibility/Joint Mobility INTERVENTIONS PLANNED:  1. Balance Exercise  2. Family Education  3. Gait Training  4. Home Exercise Program (HEP)  5. Manual Therapy  6. Neuromuscular Re-education/Strengthening  7. Range of Motion (ROM)  8. Therapeutic Activites  9. Therapeutic Exercise/Strengthening  10. modalities    TREATMENT PLAN:  Effective Dates: 11/30/2018 TO 3/3/2019 (90 days). Frequency/Duration: 3 times a week for 90 Days  GOALS: (Goals have been discussed and agreed upon with patient.)  Short-Term Functional Goals: Time Frame: 2 weeks  1. Patient will be independent with HEP. 2. Patient will demonstrate sufficient healing to discontinue use of CAM boot for all mobility. 3. Patient will improve ankle DF to 10 ° to restore joint mobility for symmetric stance phase of gait during walking speeds. Discharge Goals: Time Frame: 6 weeks  1. Patient will report no pain with daily activity. 2. Patient will be able to perform 25 single limb heel raises to normalize calf strength for return to sport demands. 3. Patient will improve ankle DF to 15 ° to restore mobility required for symmetric gait at all speeds. Rehabilitation Potential For Stated Goals: Excellent                HISTORY:   History of Present Injury/Illness (Reason for Referral):  Patient presents to therapy with primary complaint of left foot pain following above surgical repair. Initial symptoms began over the summer when she began to notice a lump at the dorsum of the midfoot. She tried various modifications to shoe lacing to improve her comfort but she began to have numbness into the toes and difficulty with running and walking activities.  She was also having pain with pushing off during the running stride or with plantar flexion movements. She notes no complications with surgery and reports the foot has been feeling good. She continues to have some numbness into the toes and reports that the ankle feels weak. She is no longer having to take medication to manage her symptoms. She notes she is cleared to wean from the boot as her symptoms allow but she has had continued soreness and swelling with increased walking around campus. 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 Past Medical History/Comorbidities:   Ms. Beth Segura  has a past medical history of Intestinal malrotation. Ms. Beth Segura  has a past surgical history that includes pr abdomen surgery proc unlisted (2011); hx appendectomy (2011); hx wisdom teeth extraction; LEFT MIDFOOT EXOSTECTOMY (Left, 11/15/2018); and LEFT DPN NEUROLYSIS ANES-PT CHOICE (Left, 11/15/2018). Social History/Living Environment:     Patient is a college student and lives on campus. 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 , Disp: , Rfl:     BLISOVI FE 1.5/30, 28, 1.5 mg-30 mcg (21)/75 mg (7) tab, , Disp: , Rfl:    Date Last Reviewed:  12/7/2018   EXAMINATION:   Observation/Orthostatic Postural Assessment:          Girth: Figure 8: 49cm B; Mid foot: 23cm L, 22cm R; Calf: 38cm L, 40 cm R. Ambulates with CAM boot. Incision is closed, dry, and covered with steri strips. Palpation:          Mild tenderness with palpation of the midfoot. (+) Tinels at the deep peroneal nerve at the anterior talocrural joint.   ROM: Ankle DF: 0 ° L; 10 ° R  Ankle PF: WNL  Ankle Inversion: 30 ° L; 40 ° R  Ankle eversion: 10 ° L; 22 ° R  Hip and knee mobility are foot      Strength:     EHL: 3/5 L; 5/5 R  Eversion: 4/5 L; 5/5 R  Inversion: 4+/5 L; 5/5 R  DF: 4-/5 L; 5/5 R  PF: 4-/5 L; 5/5 R      Special Tests:          Not applicable  Neurological Screen:        Light touch is diminished along lateral lower leg and into the dorsum of the foot. Functional Mobility:         Ambulates with walking boot  Balance:          Not currently indicated   Body Structures Involved:  1. Nerves  2. Bones  3. Joints  4. Muscles  5. Ligaments Body Functions Affected:  1. Sensory/Pain  2. Neuromusculoskeletal  3. Movement Related Activities and Participation Affected:  1. Learning and Applying Knowledge  2. General Tasks and Demands  3. Mobility  4. Self Care  5. Domestic Life  6. Interpersonal Interactions and Relationships  7. Community, Social and Civic Life   CLINICAL PRESENTATION:   CLINICAL DECISION MAKING:   Outcome Measure: Tool Used: Lower Extremity Functional Scale (LEFS)  Score:  Initial: 58/80 Most Recent: X/80 (Date: -- )   Interpretation of Score: 20 questions each scored on a 5 point scale with 0 representing \"extreme difficulty or unable to perform\" and 4 representing \"no difficulty\". The lower the score, the greater the functional disability. 80/80 represents no disability. Minimal detectable change is 9 points. Score 80 79-63 62-48 47-32 31-16 15-1 0   Modifier CH CI CJ CK CL CM CN         Medical Necessity:   · Patient is expected to demonstrate progress in strength, range of motion, balance and coordination to increase independence with community mobility and return to prior level of function. Reason for Services/Other Comments:  · Patient has demonstrated an improvement in functional level by independent performance of HEP.    TREATMENT:   (In addition to Assessment/Re-Assessment sessions the following treatments were rendered)  THERAPEUTIC EXERCISE: (see below for minutes):  Exercises per grid below to improve mobility, strength, balance and coordination. Required minimal verbal and manual cues to promote proper body alignment, promote proper body posture and promote proper body mechanics. Progressed resistance, range, repetitions and complexity of movement as indicated. MANUAL THERAPY: (see below for minutes): Joint mobilization and Soft tissue mobilization was utilized and necessary because of the patient's restricted joint motion, painful spasm, loss of articular motion and restricted motion of soft tissue. MODALITIES: (see below for minutes):       To decrease pain     Date: 11/3018 12/04/18 (visit 2) 12/06/18 (visit 3) 12/07/18 (visit 4)    Modalities: 15 min 10 min 10 min 10 min    Gameready vasopneumatic with premod stim to peroneal nerve to decrease pain and swelling Cold whirlpool x 10 min  repeat repeat                    Therapeutic Exercise: 15  min 45 min 45 min 45 min    Lift-spread-place 5 min 5 min  3 min    Towel curls 3 min Inversion and eversion 10# with towel 3 min each Inversion/eversion black tband 30x     Ankle circles 30x CW and CCW       Supine peroneal nerve glides With hip biased into adduction/IR [id number]       Hip abduction 3x15       Bike  5 min 5 min 5 min    Ankle MWM   20x with LLE forward Repeat; manual long axis distraction gr 4; anterior proximal fibular glides gr 4 3x30 to each 20x with LLE forward    Knee hugs  x1 lap repeat x1 lap    Shuffle walk  x1 lap repeat x1 lap    Ankle PF  Seated black tband 2x20 Standing with med ball between ankles 3x15 repeat    Toe raises  Leaning on wall 2x20 repeat     Squat    Single leg from 6\" step 3x10    Lunge    With posterior lateral and medial reach 3x5 to each    Single leg stance    With eyes closed 5 min total                            Proprioceptive Activities:                                Manual Therapy:                        Therapeutic Activities:                                        HEP: see 11/30/18 flow sheet for specifics. [employer]  Treatment/Session Assessment:  Sensation is returning to the 2nd and 3rd toes. Plan to continue CAM boot for community mobility through the weekend and continue to further wean from the boot next week. · Pain/ Symptoms: Initial:   4/10 \"It feels better today. It's not as swollen and the muscles of the calf just feel like they've had a workout. \" Post Session:  No increase following today's treatment session. ·   Compliance with Program/Exercises: Will assess as treatment progresses. · Recommendations/Intent for next treatment session: \"Next visit will focus on advancements to more challenging activities\".   Total Treatment Duration:  PT Patient Time In/Time Out  Time In: 0930  Time Out: GEMMA Santos

## 2018-12-10 ENCOUNTER — HOSPITAL ENCOUNTER (OUTPATIENT)
Dept: PHYSICAL THERAPY | Age: 23
Discharge: HOME | End: 2018-12-10
Payer: COMMERCIAL

## 2018-12-12 ENCOUNTER — HOSPITAL ENCOUNTER (OUTPATIENT)
Dept: PHYSICAL THERAPY | Age: 23
Discharge: HOME | End: 2018-12-12
Payer: COMMERCIAL

## 2018-12-12 PROCEDURE — 97022 WHIRLPOOL THERAPY: CPT

## 2018-12-12 PROCEDURE — 97110 THERAPEUTIC EXERCISES: CPT

## 2018-12-12 NOTE — PROGRESS NOTES
Marco A Ann  : 1995 [id number] [facility],[address]  [address] [place] [address].  [phone]   [phone]        OUTPATIENT PHYSICAL THERAPY:Daily Note 2018         ICD-10: Treatment Diagnosis: Pain in left foot (X45.268); Difficulty in walking, not elsewhere classified (R26.2)  Precautions/Allergies:   Patient has no known allergies. Fall Risk Score:    Ambulatory/Rehab Services H2 Model Falls Risk Assessment    Risk Factors:       No Risk Factors Identified Ability to Rise from Chair:       (0)  Ability to rise in a single movement    Falls Prevention Plan:       No modifications necessary   Total: (5 or greater = High Risk): 0     [facility] [employer]. All Rights Reserved. [facility] Patent #[id number]. Federal Law prohibits the replication, distribution or use without written permission from [facility] of [address] [place] [address]      (? 5 = High Risk)  MD Orders: evaluate and treat MEDICAL/REFERRING DIAGNOSIS:  Pain in unspecified foot [M79.673]   DATE OF ONSET: date of surgery: 11/15/18; please refer to medical chart for specifics  REFERRING PHYSICIAN: Janneth Diaz MD  RETURN PHYSICIAN APPOINTMENT: 2019     INITIAL ASSESSMENT:  Ms. Deshawn Recinos presents to therapy with left foot pain following above surgical procedure with removal of accessory cuneiform and release of deep peroneal nerve. She has impaired mobility through the foot and ankle with rigid, pes cavus foot posture, increased tightness of the talocrural joint and calf musculature, and increased swelling of the midfoot. She has weakness throughout the lower extremity. Weakness is present in the calf and plantar flexor musculature secondary to atrophy from immobilization. Weakness is present through the intrinsic foot extensor musculature secondary to residual motor dysfunction. This causes difficulty with all walking and mobility tasks.  Skilled therapy is required to return to prior level of function. PROBLEM LIST (Impacting functional limitations):  1. Decreased Strength  2. Decreased ADL/Functional Activities  3. Decreased Transfer Abilities  4. Decreased Ambulation Ability/Technique  5. Decreased Balance  6. Increased Pain  7. Decreased Activity Tolerance  8. Decreased Flexibility/Joint Mobility INTERVENTIONS PLANNED:  1. Balance Exercise  2. Family Education  3. Gait Training  4. Home Exercise Program (HEP)  5. Manual Therapy  6. Neuromuscular Re-education/Strengthening  7. Range of Motion (ROM)  8. Therapeutic Activites  9. Therapeutic Exercise/Strengthening  10. modalities    TREATMENT PLAN:  Effective Dates: 11/30/2018 TO 3/3/2019 (90 days). Frequency/Duration: 3 times a week for 90 Days  GOALS: (Goals have been discussed and agreed upon with patient.)  Short-Term Functional Goals: Time Frame: 2 weeks  1. Patient will be independent with HEP. 2. Patient will demonstrate sufficient healing to discontinue use of CAM boot for all mobility. 3. Patient will improve ankle DF to 10 ° to restore joint mobility for symmetric stance phase of gait during walking speeds. Discharge Goals: Time Frame: 6 weeks  1. Patient will report no pain with daily activity. 2. Patient will be able to perform 25 single limb heel raises to normalize calf strength for return to sport demands. 3. Patient will improve ankle DF to 15 ° to restore mobility required for symmetric gait at all speeds. Rehabilitation Potential For Stated Goals: Excellent                HISTORY:   History of Present Injury/Illness (Reason for Referral):  Patient presents to therapy with primary complaint of left foot pain following above surgical repair. Initial symptoms began over the summer when she began to notice a lump at the dorsum of the midfoot. She tried various modifications to shoe lacing to improve her comfort but she began to have numbness into the toes and difficulty with running and walking activities.  She was also having pain with pushing off during the running stride or with plantar flexion movements. She notes no complications with surgery and reports the foot has been feeling good. She continues to have some numbness into the toes and reports that the ankle feels weak. She is no longer having to take medication to manage her symptoms. She notes she is cleared to wean from the boot as her symptoms allow but she has had continued soreness and swelling with increased walking around campus. 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 Past Medical History/Comorbidities:   Ms. Dennise Bernheim  has a past medical history of Intestinal malrotation. Ms. Dennise Bernheim  has a past surgical history that includes pr abdomen surgery proc unlisted (2011); hx appendectomy (2011); hx wisdom teeth extraction; LEFT MIDFOOT EXOSTECTOMY (Left, 11/15/2018); and LEFT DPN NEUROLYSIS ANES-PT CHOICE (Left, 11/15/2018). Social History/Living Environment:     Patient is a college student and lives on campus. 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 , Disp: , Rfl:     BLISOVI FE 1.5/30, 28, 1.5 mg-30 mcg (21)/75 mg (7) tab, , Disp: , Rfl:    Date Last Reviewed:  12/12/2018   EXAMINATION:   Observation/Orthostatic Postural Assessment:          Girth: Figure 8: 49cm B; Mid foot: 23cm L, 22cm R; Calf: 38cm L, 40 cm R. Ambulates with CAM boot. Incision is closed, dry, and covered with steri strips. Palpation:          Mild tenderness with palpation of the midfoot. (+) Tinels at the deep peroneal nerve at the anterior talocrural joint.   ROM: Ankle DF: 0 ° L; 10 ° R  Ankle PF: WNL  Ankle Inversion: 30 ° L; 40 ° R  Ankle eversion: 10 ° L; 22 ° R  Hip and knee mobility are foot      Strength:     EHL: 3/5 L; 5/5 R  Eversion: 4/5 L; 5/5 R  Inversion: 4+/5 L; 5/5 R  DF: 4-/5 L; 5/5 R  PF: 4-/5 L; 5/5 R      Special Tests:          Not applicable  Neurological Screen:        Light touch is diminished along lateral lower leg and into the dorsum of the foot. Functional Mobility:         Ambulates with walking boot  Balance:          Not currently indicated   Body Structures Involved:  1. Nerves  2. Bones  3. Joints  4. Muscles  5. Ligaments Body Functions Affected:  1. Sensory/Pain  2. Neuromusculoskeletal  3. Movement Related Activities and Participation Affected:  1. Learning and Applying Knowledge  2. General Tasks and Demands  3. Mobility  4. Self Care  5. Domestic Life  6. Interpersonal Interactions and Relationships  7. Community, Social and Civic Life   CLINICAL PRESENTATION:   CLINICAL DECISION MAKING:   Outcome Measure: Tool Used: Lower Extremity Functional Scale (LEFS)  Score:  Initial: 58/80 Most Recent: X/80 (Date: -- )   Interpretation of Score: 20 questions each scored on a 5 point scale with 0 representing \"extreme difficulty or unable to perform\" and 4 representing \"no difficulty\". The lower the score, the greater the functional disability. 80/80 represents no disability. Minimal detectable change is 9 points. Score 80 79-63 62-48 47-32 31-16 15-1 0   Modifier CH CI CJ CK CL CM CN         Medical Necessity:   · Patient is expected to demonstrate progress in strength, range of motion, balance and coordination to increase independence with community mobility and return to prior level of function. Reason for Services/Other Comments:  · Patient has demonstrated an improvement in functional level by independent performance of HEP.    TREATMENT:   (In addition to Assessment/Re-Assessment sessions the following treatments were rendered)  THERAPEUTIC EXERCISE: (see below for minutes):  Exercises per grid below to improve mobility, strength, balance and coordination. Required minimal verbal and manual cues to promote proper body alignment, promote proper body posture and promote proper body mechanics. Progressed resistance, range, repetitions and complexity of movement as indicated. MANUAL THERAPY: (see below for minutes): Joint mobilization and Soft tissue mobilization was utilized and necessary because of the patient's restricted joint motion, painful spasm, loss of articular motion and restricted motion of soft tissue. MODALITIES: (see below for minutes):       To decrease pain     Date: 11/3018 12/04/18 (visit 2) 12/06/18 (visit 3) 12/07/18 (visit 4) 12/12/18 (visit 5)   Modalities: 15 min 10 min 10 min 10 min 10 min   Gameready vasopneumatic with premod stim to peroneal nerve to decrease pain and swelling Cold whirlpool x 10 min  repeat repeat repeat                   Therapeutic Exercise: 15  min 45 min 45 min 45 min 5 min   Lift-spread-place 5 min 5 min  3 min    Towel curls 3 min Inversion and eversion 10# with towel 3 min each Inversion/eversion black tband 30x  Inversion/eversion black 3x15   Ankle circles 30x CW and CCW       Supine peroneal nerve glides With hip biased into adduction/IR [id number]       Hip abduction 3x15    Lateral band walk blue x3 laps   Bike  5 min 5 min 5 min 5 min   Ankle MWM   20x with LLE forward Repeat; manual long axis distraction gr 4; anterior proximal fibular glides gr 4 3x30 to each 20x with LLE forward 20x with LLE forward   Knee hugs  x1 lap repeat x1 lap x1 lap   Shuffle walk  x1 lap repeat x1 lap x1 lap   Ankle PF  Seated black tband 2x20 Standing with med ball between ankles 3x15 repeat Standing on step with med ball between heels 3x10   Toe raises  Leaning on wall 2x20 repeat  3x10 leaning on wall   Squat    Single leg from 6\" step 3x10    Lunge    With posterior lateral and medial reach 3x5 to each    Single leg stance    With eyes closed 5 min total Single leg deadlift with forward reach x 10, with 3 way reach 10x to each                           Proprioceptive Activities:                                Manual Therapy:                        Therapeutic Activities:                                        HEP: see 11/30/18 flow sheet for specifics. [employer] Portal  Treatment/Session Assessment:  Demonstrates sufficient healing to wean from boot for community mobility. Will begin with 2 hours per day out of the apartment (currently going without boot in the apartment) and will progress to fully discontinue CAM boot by next Monday. · Pain/ Symptoms: Initial:   4/10 \"Its been feeling really good. It's still a little tender to try to put a shoe on the top of my foot. \" Post Session:  No increase following today's treatment session. ·   Compliance with Program/Exercises: Will assess as treatment progresses. · Recommendations/Intent for next treatment session: \"Next visit will focus on advancements to more challenging activities\".   Total Treatment Duration:  PT Patient Time In/Time Out  Time In: 1400  Time Out: [address], DPT

## 2018-12-14 ENCOUNTER — HOSPITAL ENCOUNTER (OUTPATIENT)
Dept: PHYSICAL THERAPY | Age: 23
Discharge: HOME | End: 2018-12-14
Payer: COMMERCIAL

## 2018-12-14 PROCEDURE — 97110 THERAPEUTIC EXERCISES: CPT

## 2018-12-14 PROCEDURE — 97022 WHIRLPOOL THERAPY: CPT

## 2018-12-14 NOTE — PROGRESS NOTES
Luis Manuel Mcguire  : 1995 [address] [facility],[address]  [facility] [address]  [phone]   [phone]        OUTPATIENT PHYSICAL THERAPY:Daily Note 2018         ICD-10: Treatment Diagnosis: Pain in left foot (B85.365); Difficulty in walking, not elsewhere classified (R26.2)  Precautions/Allergies:   Patient has no known allergies. Fall Risk Score:    Ambulatory/Rehab Services H2 Model Falls Risk Assessment    Risk Factors:       No Risk Factors Identified Ability to Rise from Chair:       (0)  Ability to rise in a single movement    Falls Prevention Plan:       No modifications necessary   Total: (5 or greater = High Risk): 0     [facility] [employer]. All Rights Reserved. [facility] Patent #[id number]. Federal Law prohibits the replication, distribution or use without written permission from [employer]      (? 5 = High Risk)  MD Orders: evaluate and treat MEDICAL/REFERRING DIAGNOSIS:  Pain in unspecified foot [M79.673]   DATE OF ONSET: date of surgery: 11/15/18; please refer to medical chart for specifics  REFERRING PHYSICIAN: Columba Toth MD  RETURN PHYSICIAN APPOINTMENT: 2019     INITIAL ASSESSMENT:  Ms. Chrissie Mcgrath presents to therapy with left foot pain following above surgical procedure with removal of accessory cuneiform and release of deep peroneal nerve. She has impaired mobility through the foot and ankle with rigid, pes cavus foot posture, increased tightness of the talocrural joint and calf musculature, and increased swelling of the midfoot. She has weakness throughout the lower extremity. Weakness is present in the calf and plantar flexor musculature secondary to atrophy from immobilization. Weakness is present through the intrinsic foot extensor musculature secondary to residual motor dysfunction. This causes difficulty with all walking and mobility tasks.  Skilled therapy is required to return to prior level of function. PROBLEM LIST (Impacting functional limitations):  1. Decreased Strength  2. Decreased ADL/Functional Activities  3. Decreased Transfer Abilities  4. Decreased Ambulation Ability/Technique  5. Decreased Balance  6. Increased Pain  7. Decreased Activity Tolerance  8. Decreased Flexibility/Joint Mobility INTERVENTIONS PLANNED:  1. Balance Exercise  2. Family Education  3. Gait Training  4. Home Exercise Program (HEP)  5. Manual Therapy  6. Neuromuscular Re-education/Strengthening  7. Range of Motion (ROM)  8. Therapeutic Activites  9. Therapeutic Exercise/Strengthening  10. modalities    TREATMENT PLAN:  Effective Dates: 11/30/2018 TO 3/3/2019 (90 days). Frequency/Duration: 3 times a week for 90 Days  GOALS: (Goals have been discussed and agreed upon with patient.)  Short-Term Functional Goals: Time Frame: 2 weeks  1. Patient will be independent with HEP. 2. Patient will demonstrate sufficient healing to discontinue use of CAM boot for all mobility. 3. Patient will improve ankle DF to 10 ° to restore joint mobility for symmetric stance phase of gait during walking speeds. Discharge Goals: Time Frame: 6 weeks  1. Patient will report no pain with daily activity. 2. Patient will be able to perform 25 single limb heel raises to normalize calf strength for return to sport demands. 3. Patient will improve ankle DF to 15 ° to restore mobility required for symmetric gait at all speeds. Rehabilitation Potential For Stated Goals: Excellent                HISTORY:   History of Present Injury/Illness (Reason for Referral):  Patient presents to therapy with primary complaint of left foot pain following above surgical repair. Initial symptoms began over the summer when she began to notice a lump at the dorsum of the midfoot. She tried various modifications to shoe lacing to improve her comfort but she began to have numbness into the toes and difficulty with running and walking activities.  She was also having pain with pushing off during the running stride or with plantar flexion movements. She notes no complications with surgery and reports the foot has been feeling good. She continues to have some numbness into the toes and reports that the ankle feels weak. She is no longer having to take medication to manage her symptoms. She notes she is cleared to wean from the boot as her symptoms allow but she has had continued soreness and swelling with increased walking around campus. 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 Past Medical History/Comorbidities:   Ms. Emiliano Eagle  has a past medical history of Intestinal malrotation. Ms. Emiliano Eagle  has a past surgical history that includes pr abdomen surgery proc unlisted (2011); hx appendectomy (2011); hx wisdom teeth extraction; LEFT MIDFOOT EXOSTECTOMY (Left, 11/15/2018); and LEFT DPN NEUROLYSIS ANES-PT CHOICE (Left, 11/15/2018). Social History/Living Environment:     Patient is a college student and lives on campus. 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 , Disp: , Rfl:     BLISOVI FE 1.5/30, 28, 1.5 mg-30 mcg (21)/75 mg (7) tab, , Disp: , Rfl:    Date Last Reviewed:  12/14/2018   EXAMINATION:   Observation/Orthostatic Postural Assessment:          Girth: Figure 8: 49cm B; Mid foot: 23cm L, 22cm R; Calf: 38cm L, 40 cm R. Ambulates with CAM boot. Incision is closed, dry, and covered with steri strips. Palpation:          Mild tenderness with palpation of the midfoot. (+) Tinels at the deep peroneal nerve at the anterior talocrural joint.   ROM: Ankle DF: 0 ° L; 10 ° R  Ankle PF: WNL  Ankle Inversion: 30 ° L; 40 ° R  Ankle eversion: 10 ° L; 22 ° R  Hip and knee mobility are foot      Strength:     EHL: 3/5 L; 5/5 R  Eversion: 4/5 L; 5/5 R  Inversion: 4+/5 L; 5/5 R  DF: 4-/5 L; 5/5 R  PF: 4-/5 L; 5/5 R      Special Tests:          Not applicable  Neurological Screen:        Light touch is diminished along lateral lower leg and into the dorsum of the foot. Functional Mobility:         Ambulates with walking boot  Balance:          Not currently indicated   Body Structures Involved:  1. Nerves  2. Bones  3. Joints  4. Muscles  5. Ligaments Body Functions Affected:  1. Sensory/Pain  2. Neuromusculoskeletal  3. Movement Related Activities and Participation Affected:  1. Learning and Applying Knowledge  2. General Tasks and Demands  3. Mobility  4. Self Care  5. Domestic Life  6. Interpersonal Interactions and Relationships  7. Community, Social and Civic Life   CLINICAL PRESENTATION:   CLINICAL DECISION MAKING:   Outcome Measure: Tool Used: Lower Extremity Functional Scale (LEFS)  Score:  Initial: 58/80 Most Recent: X/80 (Date: -- )   Interpretation of Score: 20 questions each scored on a 5 point scale with 0 representing \"extreme difficulty or unable to perform\" and 4 representing \"no difficulty\". The lower the score, the greater the functional disability. 80/80 represents no disability. Minimal detectable change is 9 points. Score 80 79-63 62-48 47-32 31-16 15-1 0   Modifier CH CI CJ CK CL CM CN         Medical Necessity:   · Patient is expected to demonstrate progress in strength, range of motion, balance and coordination to increase independence with community mobility and return to prior level of function. Reason for Services/Other Comments:  · Patient has demonstrated an improvement in functional level by independent performance of HEP.    TREATMENT:   (In addition to Assessment/Re-Assessment sessions the following treatments were rendered)  THERAPEUTIC EXERCISE: (see below for minutes):  Exercises per grid below to improve mobility, strength, balance and coordination. Required minimal verbal and manual cues to promote proper body alignment, promote proper body posture and promote proper body mechanics. Progressed resistance, range, repetitions and complexity of movement as indicated. MANUAL THERAPY: (see below for minutes): Joint mobilization and Soft tissue mobilization was utilized and necessary because of the patient's restricted joint motion, painful spasm, loss of articular motion and restricted motion of soft tissue. MODALITIES: (see below for minutes):       To decrease pain     Date: 11/3018 12/04/18 (visit 2) 12/06/18 (visit 3) 12/07/18 (visit 4) 12/12/18 (visit 5) 12/14/18 (visit 6)    Modalities: 15 min 10 min 10 min 10 min 10 min 10 min    Gameready vasopneumatic with premod stim to peroneal nerve to decrease pain and swelling Cold whirlpool x 10 min  repeat repeat repeat repeat                        Therapeutic Exercise: 15  min 45 min 45 min 45 min 5 min 45 min    Lift-spread-place 5 min 5 min  3 min      Towel curls 3 min Inversion and eversion 10# with towel 3 min each Inversion/eversion black tband 30x  Inversion/eversion black 3x15 repeat    Ankle circles 30x CW and CCW         Supine peroneal nerve glides With hip biased into adduction/IR [id number]         Hip abduction 3x15    Lateral band walk blue x3 laps Lateral band walk x 2 laps    Bike  5 min 5 min 5 min 5 min 5 min    Ankle MWM   20x with LLE forward Repeat; manual long axis distraction gr 4; anterior proximal fibular glides gr 4 3x30 to each 20x with LLE forward 20x with LLE forward 20x with LLE forward, long axis distraction 3 min    Knee hugs  x1 lap repeat x1 lap x1 lap x1 lap with foot flat, x 1 lap with heel raise    Shuffle walk  x1 lap repeat x1 lap x1 lap x1 lap    Ankle PF  Seated black tband 2x20 Standing with med ball between ankles 3x15 repeat Standing on step with med ball between heels 3x10 Repeat, 3x15    Toe raises  Leaning on wall 2x20 repeat  3x10 leaning on wall 3x10 leaning on wall    Squat    Single leg from 6\" step 3x10      Lunge    With posterior lateral and medial reach 3x5 to each  With slide 10x, with rotation 8# 3x10    Single leg stance    With eyes closed 5 min total Single leg deadlift with forward reach x 10, with 3 way reach 10x to each On airex with ball toss [id number]                                  Proprioceptive Activities:                                        Manual Therapy:                              Therapeutic Activities:                                                  HEP: see 11/30/18 flow sheet for specifics. [facility] Portal  Treatment/Session Assessment:  Swelling is continuing to improve with increased weight bearing. Demonstrates sufficient calf strength to progress to single leg calf raises. Discussed return to running with anticipation to start within the next 2-4 weeks with requirements of: full ankle DF measured by lunge wall stretch, 20 single leg calf raises, and no swelling reactivity to weight bearing. Will begin with 2 hours per day out of the apartment (currently going without boot in the apartment) and will progress to fully discontinue CAM boot by next Monday. · Pain/ Symptoms: Initial:   4/10 \"It has been feeling good with using the boot less. It hasn't been as stiff in the morning as I thought it would be. \" Post Session:  No increase following today's treatment session. ·   Compliance with Program/Exercises: Will assess as treatment progresses. · Recommendations/Intent for next treatment session: \"Next visit will focus on advancements to more challenging activities\".   Total Treatment Duration:  PT Patient Time In/Time Out  Time In: 1400  Time Out: [address]man DPT

## 2018-12-17 ENCOUNTER — HOSPITAL ENCOUNTER (OUTPATIENT)
Dept: PHYSICAL THERAPY | Age: 23
Discharge: HOME | End: 2018-12-17
Payer: COMMERCIAL

## 2018-12-17 PROCEDURE — 97022 WHIRLPOOL THERAPY: CPT

## 2018-12-17 PROCEDURE — 97110 THERAPEUTIC EXERCISES: CPT

## 2018-12-17 NOTE — PROGRESS NOTES
Dwayne aLng  : 1995 [address] [facility],[address]  [address] [place] [address].  [phone]   [phone]        OUTPATIENT PHYSICAL THERAPY:Daily Note and Discharge 2018         ICD-10: Treatment Diagnosis: Pain in left foot (O50.085); Difficulty in walking, not elsewhere classified (R26.2)  Precautions/Allergies:   Patient has no known allergies. Fall Risk Score:    Ambulatory/Rehab Services H2 Model Falls Risk Assessment    Risk Factors:       No Risk Factors Identified Ability to Rise from Chair:       (0)  Ability to rise in a single movement    Falls Prevention Plan:       No modifications necessary   Total: (5 or greater = High Risk): 0     [facility] of [employer]. All Rights Reserved. [facility] Patent #[id number]. Federal Law prohibits the replication, distribution or use without written permission from [facility] of [address] [place] [address]      (? 5 = High Risk)  MD Orders: evaluate and treat MEDICAL/REFERRING DIAGNOSIS:  Pain in unspecified foot [M79.673]   DATE OF ONSET: date of surgery: 11/15/18; please refer to medical chart for specifics  REFERRING PHYSICIAN: Florinda Hernandez MD  RETURN PHYSICIAN APPOINTMENT: 2019     INITIAL ASSESSMENT:  Ms. Darcy Monzon presents to therapy with left foot pain following above surgical procedure with removal of accessory cuneiform and release of deep peroneal nerve. She has impaired mobility through the foot and ankle with rigid, pes cavus foot posture, increased tightness of the talocrural joint and calf musculature, and increased swelling of the midfoot. She has weakness throughout the lower extremity. Weakness is present in the calf and plantar flexor musculature secondary to atrophy from immobilization. Weakness is present through the intrinsic foot extensor musculature secondary to residual motor dysfunction. This causes difficulty with all walking and mobility tasks.  Skilled therapy is required to return to prior level of function. PROBLEM LIST (Impacting functional limitations):  1. Decreased Strength  2. Decreased ADL/Functional Activities  3. Decreased Transfer Abilities  4. Decreased Ambulation Ability/Technique  5. Decreased Balance  6. Increased Pain  7. Decreased Activity Tolerance  8. Decreased Flexibility/Joint Mobility INTERVENTIONS PLANNED:  1. Balance Exercise  2. Family Education  3. Gait Training  4. Home Exercise Program (HEP)  5. Manual Therapy  6. Neuromuscular Re-education/Strengthening  7. Range of Motion (ROM)  8. Therapeutic Activites  9. Therapeutic Exercise/Strengthening  10. modalities    TREATMENT PLAN:  Effective Dates: 11/30/2018 TO 3/3/2019 (90 days). Frequency/Duration: 3 times a week for 90 Days  GOALS: (Goals have been discussed and agreed upon with patient.)  Short-Term Functional Goals: Time Frame: 2 weeks  1. Patient will be independent with HEP. MET  2. Patient will demonstrate sufficient healing to discontinue use of CAM boot for all mobility. MET  3. Patient will improve ankle DF to 10 ° to restore joint mobility for symmetric stance phase of gait during walking speeds. MET  Discharge Goals: Time Frame: 6 weeks  1. Patient will report no pain with daily activity. PROGRESSING  2. Patient will be able to perform 25 single limb heel raises to normalize calf strength for return to sport demands. PROGRESSING  3. Patient will improve ankle DF to 15 ° to restore mobility required for symmetric gait at all speeds. PROGRESSING  Rehabilitation Potential For Stated Goals: Excellent                HISTORY:   History of Present Injury/Illness (Reason for Referral):  Patient presents to therapy with primary complaint of left foot pain following above surgical repair. Initial symptoms began over the summer when she began to notice a lump at the dorsum of the midfoot.  She tried various modifications to shoe lacing to improve her comfort but she began to have numbness into the toes and difficulty with running and walking activities. She was also having pain with pushing off during the running stride or with plantar flexion movements. She notes no complications with surgery and reports the foot has been feeling good. She continues to have some numbness into the toes and reports that the ankle feels weak. She is no longer having to take medication to manage her symptoms. She notes she is cleared to wean from the boot as her symptoms allow but she has had continued soreness and swelling with increased walking around campus. 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 Past Medical History/Comorbidities:   Ms. Eli Thornton  has a past medical history of Intestinal malrotation. Ms. Eli Thornton  has a past surgical history that includes pr abdomen surgery proc unlisted (2011); hx appendectomy (2011); hx wisdom teeth extraction; LEFT MIDFOOT EXOSTECTOMY (Left, 11/15/2018); and LEFT DPN NEUROLYSIS ANES-PT CHOICE (Left, 11/15/2018). Social History/Living Environment:     Patient is a college student and lives on campus. 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 , Disp: , Rfl:     BLISOVI FE 1.5/30, 28, 1.5 mg-30 mcg (21)/75 mg (7) tab, , Disp: , Rfl:    Date Last Reviewed:  12/17/2018   EXAMINATION:   Observation/Orthostatic Postural Assessment:          Girth: Figure 8: 49cm B; Mid foot: 23cm L, 22cm R; Calf: 38cm L, 40 cm R. Ambulates with CAM boot. Incision is closed, dry, and covered with steri strips.   Palpation:          Mild tenderness with palpation of the midfoot. (+) Tinels at the deep peroneal nerve at the anterior talocrural joint. ROM:     Ankle DF: 0 ° L; 10 ° R  Ankle PF: WNL  Ankle Inversion: 30 ° L; 40 ° R  Ankle eversion: 10 ° L; 22 ° R  Hip and knee mobility are foot   12/17/18 update: Ankle DF: 12 °   Ankle inversion: 40 °   Ankle eversion: 15 °     Strength:     EHL: 3/5 L; 5/5 R  Eversion: 4/5 L; 5/5 R  Inversion: 4+/5 L; 5/5 R  DF: 4-/5 L; 5/5 R  PF: 4-/5 L; 5/5 R   12/17/18 update:   EHL: 4+/5   Eversion: 4+/5   DF: 4/5   PF: 4+/5    Special Tests:          Not applicable  Neurological Screen:        Light touch is diminished along lateral lower leg and into the dorsum of the foot. Functional Mobility:         Ambulates with walking boot  Balance:          Not currently indicated   Body Structures Involved:  1. Nerves  2. Bones  3. Joints  4. Muscles  5. Ligaments Body Functions Affected:  1. Sensory/Pain  2. Neuromusculoskeletal  3. Movement Related Activities and Participation Affected:  1. Learning and Applying Knowledge  2. General Tasks and Demands  3. Mobility  4. Self Care  5. Domestic Life  6. Interpersonal Interactions and Relationships  7. Community, Social and Civic Life   CLINICAL PRESENTATION:   CLINICAL DECISION MAKING:   Outcome Measure: Tool Used: Lower Extremity Functional Scale (LEFS)  Score:  Initial: 58/80 Most Recent: X/80 (Date: -- )   Interpretation of Score: 20 questions each scored on a 5 point scale with 0 representing \"extreme difficulty or unable to perform\" and 4 representing \"no difficulty\". The lower the score, the greater the functional disability. 80/80 represents no disability. Minimal detectable change is 9 points. Score 80 79-63 62-48 47-32 31-16 15-1 0   Modifier CH CI CJ CK CL CM CN         Medical Necessity:   · Patient is expected to demonstrate progress in strength, range of motion, balance and coordination to increase independence with community mobility and return to prior level of function.   Reason for Services/Other Comments:  · Patient has demonstrated an improvement in functional level by independent performance of HEP. TREATMENT:   (In addition to Assessment/Re-Assessment sessions the following treatments were rendered)  THERAPEUTIC EXERCISE: (see below for minutes):  Exercises per grid below to improve mobility, strength, balance and coordination. Required minimal verbal and manual cues to promote proper body alignment, promote proper body posture and promote proper body mechanics. Progressed resistance, range, repetitions and complexity of movement as indicated. MANUAL THERAPY: (see below for minutes): Joint mobilization and Soft tissue mobilization was utilized and necessary because of the patient's restricted joint motion, painful spasm, loss of articular motion and restricted motion of soft tissue. MODALITIES: (see below for minutes):       To decrease pain     Date: 11/3018 12/04/18 (visit 2) 12/06/18 (visit 3) 12/07/18 (visit 4) 12/12/18 (visit 5) 12/14/18 (visit 6) 12/17/18 (visit 7)   Modalities: 15 min 10 min 10 min 10 min 10 min 10 min 10 min   Gameready vasopneumatic with premod stim to peroneal nerve to decrease pain and swelling Cold whirlpool x 10 min  repeat repeat repeat repeat repeat                       Therapeutic Exercise: 15  min 45 min 45 min 45 min 5 min 45 min 45 min   Lift-spread-place 5 min 5 min  3 min      Towel curls 3 min Inversion and eversion 10# with towel 3 min each Inversion/eversion black tband 30x  Inversion/eversion black 3x15 repeat repeat   Ankle circles 30x CW and CCW         Supine peroneal nerve glides With hip biased into adduction/IR [id number]         Hip abduction 3x15    Lateral band walk blue x3 laps Lateral band walk x 2 laps Black tband x 2 laps   Bike  5 min 5 min 5 min 5 min 5 min 5 min   Ankle MWM   20x with LLE forward Repeat; manual long axis distraction gr 4; anterior proximal fibular glides gr 4 3x30 to each 20x with LLE forward 20x with LLE forward 20x with LLE forward, long axis distraction 3 min repeat   Knee hugs  x1 lap repeat x1 lap x1 lap x1 lap with foot flat, x 1 lap with heel raise repeat   Shuffle walk  x1 lap repeat x1 lap x1 lap x1 lap x1 lap   Ankle PF  Seated black tband 2x20 Standing with med ball between ankles 3x15 repeat Standing on step with med ball between heels 3x10 Repeat, 3x15 Double leg 3x15   Toe raises  Leaning on wall 2x20 repeat  3x10 leaning on wall 3x10 leaning on wall 3x15 leaning on wall   Squat    Single leg from 6\" step 3x10      Lunge    With posterior lateral and medial reach 3x5 to each  With slide 10x, with rotation 8# 3x10 3x10 B on slide   Single leg stance    With eyes closed 5 min total Single leg deadlift with forward reach x 10, with 3 way reach 10x to each On airex with ball toss [id number]                                  Proprioceptive Activities:                                        Manual Therapy:                              Therapeutic Activities:                                                  HEP: see 11/30/18 flow sheet for specifics. [email or web address] Portal  Treatment/Session Assessment:  Demonstrates good mobility through the ankle and foot. Patient is returning home over [facility] and will follow up with her ATC on her return to campus prior to fully returning to sport. . Discussed return to running with anticipation to start within the next 2-4 weeks with requirements of: full ankle DF measured by lunge wall stretch, 20 single leg calf raises, and no swelling reactivity to weight bearing. Will begin with 2 hours per day out of the apartment (currently going without boot in the apartment) and will progress to fully discontinue CAM boot by next Monday. · Pain/ Symptoms: Initial:   4/10 \"The foot feels really good. I didn't wear the boot at all the past few days. \" Post Session:  No increase following today's treatment session. ·   Compliance with Program/Exercises:  Will assess as treatment progresses. · Recommendations/Intent for next treatment session: \"Next visit will focus on advancements to more challenging activities\".   Total Treatment Duration:  PT Patient Time In/Time Out  Time In: 1400  Time Out: [address], [place]

## 2018-12-19 ENCOUNTER — APPOINTMENT (OUTPATIENT)
Dept: PHYSICAL THERAPY | Age: 23
End: 2018-12-19
Payer: COMMERCIAL

## 2018-12-21 ENCOUNTER — APPOINTMENT (OUTPATIENT)
Dept: PHYSICAL THERAPY | Age: 23
End: 2018-12-21
Payer: COMMERCIAL

## (undated) DEVICE — 2000CC GUARDIAN II: Brand: GUARDIAN

## (undated) DEVICE — PADDING CAST W2INXL4YD NONSTERILE COT COHESIVE HND TEARABLE

## (undated) DEVICE — (D)PREP SKN CHLRAPRP APPL 26ML -- CONVERT TO ITEM 371833

## (undated) DEVICE — AMD ANTIMICROBIAL GAUZE SPONGES,12 PLY USP TYPE VII, 0.2% POLYHEXAMETHYLENE BIGUANIDE HCI (PHMB): Brand: CURITY

## (undated) DEVICE — BUTTON SWITCH PENCIL BLADE ELECTRODE, HOLSTER: Brand: EDGE

## (undated) DEVICE — ZIMMER® STERILE DISPOSABLE TOURNIQUET CUFF WITH PLC, DUAL PORT, SINGLE BLADDER, 18 IN. (46 CM)

## (undated) DEVICE — REM POLYHESIVE ADULT PATIENT RETURN ELECTRODE: Brand: VALLEYLAB

## (undated) DEVICE — Device

## (undated) DEVICE — SOLUTION IV 1000ML 0.9% SOD CHL

## (undated) DEVICE — STRETCH BANDAGE ROLL: Brand: DERMACEA

## (undated) DEVICE — BNDG ELAS COBAN 2INX5YD NS --

## (undated) DEVICE — CURITY NON-ADHERENT STRIPS: Brand: CURITY